# Patient Record
Sex: MALE | NOT HISPANIC OR LATINO | URBAN - METROPOLITAN AREA
[De-identification: names, ages, dates, MRNs, and addresses within clinical notes are randomized per-mention and may not be internally consistent; named-entity substitution may affect disease eponyms.]

---

## 2017-05-03 ENCOUNTER — IMPORTED ENCOUNTER (OUTPATIENT)
Dept: URBAN - METROPOLITAN AREA CLINIC 1 | Facility: CLINIC | Age: 42
End: 2017-05-03

## 2017-05-03 PROBLEM — H16.8: Noted: 2017-05-03

## 2017-05-03 PROCEDURE — 92012 INTRM OPH EXAM EST PATIENT: CPT

## 2017-05-03 NOTE — PATIENT DISCUSSION
1. 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 Begin Lotemax TID OD till sample out (Sample x1 given) ; Begin PF Systane Q30 minutes w/a OU. (Sample of Lotemax and PF Systane given) Pt advised to remain out of lenses until rechecked. Consider Dailies. 1 gtt of Lotemax applied OU prior to leaving by [facility]. Return for an appointment in Friday PM 10 (recheck K) with Dr. Grecia Doan.

## 2017-05-05 ENCOUNTER — IMPORTED ENCOUNTER (OUTPATIENT)
Dept: URBAN - METROPOLITAN AREA CLINIC 1 | Facility: CLINIC | Age: 42
End: 2017-05-05

## 2017-05-05 PROBLEM — H16.8: Noted: 2017-05-05

## 2017-05-05 PROCEDURE — 92012 INTRM OPH EXAM EST PATIENT: CPT

## 2017-05-05 NOTE — PATIENT DISCUSSION
1. 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 Safe CL Wear discussed. Cont Lotemax TID OD till sample out. May use Refresh Optive ATs QID OU. Ok to wear CTLs. Consider switching to a different daily CTL. (Dailies Total One). Return for an appointment in October 40/cc with Dr. Jose Miguel Alfred.

## 2017-10-06 ENCOUNTER — IMPORTED ENCOUNTER (OUTPATIENT)
Dept: URBAN - METROPOLITAN AREA CLINIC 1 | Facility: CLINIC | Age: 42
End: 2017-10-06

## 2017-10-06 PROBLEM — H52.4: Noted: 2017-10-06

## 2017-10-06 PROCEDURE — S0621 ROUTINE OPHTHALMOLOGICAL EXA: HCPCS

## 2017-10-06 NOTE — PATIENT DISCUSSION
1.  Presbyopia: Rx was given for corrective spectacles if indicated. 2.  Return for an appointment in 1 year for 40 and Contact lens check. with Dr. Johnny Johnston.

## 2018-10-19 ENCOUNTER — IMPORTED ENCOUNTER (OUTPATIENT)
Dept: URBAN - METROPOLITAN AREA CLINIC 1 | Facility: CLINIC | Age: 43
End: 2018-10-19

## 2018-10-19 PROBLEM — H52.13: Noted: 2018-10-19

## 2018-10-19 PROBLEM — H52.4: Noted: 2018-10-19

## 2018-10-19 PROCEDURE — S0621 ROUTINE OPHTHALMOLOGICAL EXA: HCPCS

## 2018-10-19 NOTE — PATIENT DISCUSSION
1. Myopia: Rx was given for correction if indicated and requested. 2. Presbyopia 3. Dry Eyes w/ PEK OU Finalized CTL RXReturn for an appointment in 1 year 40/cc (consider  CTLs) with Dr. Alexandria Dos Santos.

## 2019-10-23 ENCOUNTER — IMPORTED ENCOUNTER (OUTPATIENT)
Dept: URBAN - METROPOLITAN AREA CLINIC 1 | Facility: CLINIC | Age: 44
End: 2019-10-23

## 2019-10-23 PROBLEM — H52.4: Noted: 2019-10-23

## 2019-10-23 PROBLEM — H52.13: Noted: 2019-10-23

## 2019-10-23 PROCEDURE — S0621 ROUTINE OPHTHALMOLOGICAL EXA: HCPCS

## 2019-10-23 NOTE — PATIENT DISCUSSION
1. Myopia/Presbyopia: Rx was given for correction if indicated and requested. 3. Dry Eyes w/ PEK OU Trial CTL given today. Return for an appointment in 1 week CC with Dr. Peggy Noguera.

## 2019-10-30 ENCOUNTER — IMPORTED ENCOUNTER (OUTPATIENT)
Dept: URBAN - METROPOLITAN AREA CLINIC 1 | Facility: CLINIC | Age: 44
End: 2019-10-30

## 2019-10-30 NOTE — PATIENT DISCUSSION
1. CC Today OU -- Good Fit Comfort and Vision OU. Finalized CTL Rx was given to patient today. Return for an appointment in 1 YR for a 36 / CC OU with Dr. Johnny Johnston.

## 2020-12-04 ENCOUNTER — IMPORTED ENCOUNTER (OUTPATIENT)
Dept: URBAN - METROPOLITAN AREA CLINIC 1 | Facility: CLINIC | Age: 45
End: 2020-12-04

## 2020-12-04 PROBLEM — H52.13: Noted: 2020-12-04

## 2020-12-04 PROBLEM — H52.4: Noted: 2020-12-04

## 2020-12-04 PROCEDURE — S0621 ROUTINE OPHTHALMOLOGICAL EXA: HCPCS

## 2020-12-04 NOTE — PATIENT DISCUSSION
1. Myopia/Presbyopia: Rx was given for correction if indicated and requested. 2. Dry Eyes w/ PEK OU- Recommend the use of AT's BID OUFinalized CTL MRx Return for an appointment in 1 yr 40/CC with Dr. Colleen Montgomery.

## 2020-12-10 NOTE — PROCEDURE NOTE: CLINICAL
PROCEDURE NOTE: Lucentis 0.5mg Sample OS. Diagnosis: Branch Retinal Vein Occlusion with Macular Edema. Anesthesia: Topical. Prep: Betadine Drops and Betadine Scrub. Prior to injection, risks/benefits/alternatives discussed including infection, loss of vision, hemorrhage, cataract, glaucoma, retinal tears or detachment and patient wished to proceed. Informed consent obtained. Patient was advised the purpose of the treatment was to slow the progression of the disease, and may not improve visual acuity. Betadine prep was performed. Injection site: 3-4 mm from the limbus. A surgical mask was worn. A lid speculum was used. Intravitreal injection of Lucentis 0.5mg/0.05 ml was given. Discarded remaining *. CRA perfusion confirmed. The eye was irrigated with sterile irrigating solution. The betadine was washed away. Count fingers vision was verified. The patient tolerated the procedure well and there were no complications from the procedure. Post procedure instructions were given. Patient given office phone number/answering service number and advised to call immediately should there be an increase in floaters or redness, loss of vision or pain, or should they have any other questions or concerns. Maynor Lockett

## 2020-12-10 NOTE — PATIENT DISCUSSION
Lucentis recommended today after discussion of benefits, risks and alternatives. The injection was given and tolerated well by patient. Post-injection instructions were reviewed and understood by the patient.

## 2020-12-14 NOTE — PROCEDURE NOTE: CLINICAL
PROCEDURE NOTE: PRP OS. Diagnosis: Branch Retinal Vein Occlusion with Macular Edema. Anesthesia: Topical. Prior to PRP, risks/benefits/alternatives to laser discussed including loss of vision, decreased peripheral and night vision and patient wished to proceed. Spot size: 200 um. Power: 380 mW. Number of pulses: 30.  Patient tolerated procedure well. There were no complications. Post procedure instructions given. Jorge Luis Ayala

## 2020-12-14 NOTE — PATIENT DISCUSSION
Will add PRP today, treatment only as previously discussed.  PRP consented to, completed and tolerated well.

## 2021-01-07 NOTE — PATIENT DISCUSSION
Based on today’s exam, diagnostic studies, and review of records, the determination was made for NO TREATMENT NEEDED TODAY. No Edema, improved, OBSERVATION recommended.

## 2021-12-06 ENCOUNTER — IMPORTED ENCOUNTER (OUTPATIENT)
Dept: URBAN - METROPOLITAN AREA CLINIC 1 | Facility: CLINIC | Age: 46
End: 2021-12-06

## 2021-12-06 PROBLEM — H52.13: Noted: 2021-12-06

## 2021-12-06 PROBLEM — H52.4: Noted: 2021-12-06

## 2021-12-06 PROCEDURE — S0621 ROUTINE OPHTHALMOLOGICAL EXA: HCPCS

## 2021-12-06 NOTE — PATIENT DISCUSSION
1. Myopia -- Rx was given for correction if indicated and requested. 2. Presbyopia -- 3. Dry Eyes w/ PEK OU- Recommend the use of AT's BID OUFinalized CTL RxReturn for an appointment in 1 year 40/CC with Dr. Venu Oden.

## 2022-04-02 ASSESSMENT — VISUAL ACUITY
OD_SC: 20/20
OS_SC: 20/20
OD_CC: J1+
OU_CC: J1+
OS_CC: J1+
OS_CC: J1
OD_CC: J1+-1
OD_SC: 20/20
OS_CC: J1
OD_SC: 20/20
OD_SC: 20/20
OD_CC: J1
OS_SC: 20/20
OS_SC: 20/20
OD_SC: 20/20
OS_CC: J1
OS_CC: J1+-1
OS_SC: 20/20
OD_CC: J1+
OD_SC: 20/20
OS_SC: 20/20-2
OS_SC: 20/20
OS_SC: 20/20-1
OD_SC: 20/20
OD_CC: J1
OD_SC: 20/20
OS_CC: J1+
OS_SC: 20/20
OD_CC: J1
OU_SC: 20/20

## 2022-04-02 ASSESSMENT — TONOMETRY
OS_IOP_MMHG: 14
OD_IOP_MMHG: 15
OD_IOP_MMHG: 17
OS_IOP_MMHG: 14
OD_IOP_MMHG: 17
OD_IOP_MMHG: 14
OD_IOP_MMHG: 16
OS_IOP_MMHG: 16
OS_IOP_MMHG: 17
OD_IOP_MMHG: 14
OS_IOP_MMHG: 16
OD_IOP_MMHG: 14
OS_IOP_MMHG: 15
OS_IOP_MMHG: 14

## 2022-12-07 ENCOUNTER — COMPREHENSIVE EXAM (OUTPATIENT)
Dept: URBAN - METROPOLITAN AREA CLINIC 1 | Facility: CLINIC | Age: 47
End: 2022-12-07

## 2022-12-07 PROCEDURE — 92015 DETERMINE REFRACTIVE STATE: CPT

## 2022-12-07 PROCEDURE — 92310-12 CONTACT LENS FITTING - 90

## 2022-12-07 PROCEDURE — 92014 COMPRE OPH EXAM EST PT 1/>: CPT

## 2022-12-07 ASSESSMENT — TONOMETRY
OS_IOP_MMHG: 14
OD_IOP_MMHG: 12

## 2022-12-07 ASSESSMENT — VISUAL ACUITY
OS_CC: J1
OD_CC: J1
OS_CC: 20/20
OD_CC: 20/20

## 2023-12-13 ENCOUNTER — COMPREHENSIVE EXAM (OUTPATIENT)
Dept: URBAN - METROPOLITAN AREA CLINIC 1 | Facility: CLINIC | Age: 48
End: 2023-12-13

## 2023-12-13 DIAGNOSIS — Z01.00: ICD-10-CM

## 2023-12-13 DIAGNOSIS — H52.13: ICD-10-CM

## 2023-12-13 DIAGNOSIS — Z46.0: ICD-10-CM

## 2023-12-13 DIAGNOSIS — H52.4: ICD-10-CM

## 2023-12-13 PROCEDURE — 92310-12 CONTACT LENS FITTING - 90

## 2023-12-13 PROCEDURE — 92015 DETERMINE REFRACTIVE STATE: CPT

## 2023-12-13 PROCEDURE — 92014 COMPRE OPH EXAM EST PT 1/>: CPT

## 2023-12-13 ASSESSMENT — TONOMETRY
OD_IOP_MMHG: 15
OS_IOP_MMHG: 15

## 2023-12-13 ASSESSMENT — VISUAL ACUITY
OS_CC: J1
OS_CC: J3
OS_CC: 20/15
OD_CC: J2
OS_CC: 20/25-1
OD_CC: 20/20
OD_CC: J2
OD_CC: 20/20

## 2024-12-18 ENCOUNTER — COMPREHENSIVE EXAM (OUTPATIENT)
Age: 49
End: 2024-12-18

## 2024-12-18 DIAGNOSIS — H52.4: ICD-10-CM

## 2024-12-18 DIAGNOSIS — H52.13: ICD-10-CM

## 2024-12-18 DIAGNOSIS — Z01.00: ICD-10-CM

## 2024-12-18 DIAGNOSIS — Z46.0: ICD-10-CM

## 2024-12-18 PROCEDURE — 92015 DETERMINE REFRACTIVE STATE: CPT

## 2024-12-18 PROCEDURE — 92310-3 LEVEL 3 SOFT LENS UPDATE

## 2024-12-18 PROCEDURE — 92014 COMPRE OPH EXAM EST PT 1/>: CPT
